# Patient Record
Sex: MALE | Race: WHITE | NOT HISPANIC OR LATINO | Employment: OTHER | URBAN - METROPOLITAN AREA
[De-identification: names, ages, dates, MRNs, and addresses within clinical notes are randomized per-mention and may not be internally consistent; named-entity substitution may affect disease eponyms.]

---

## 2024-07-11 ENCOUNTER — APPOINTMENT (OUTPATIENT)
Dept: RADIOLOGY | Facility: HOSPITAL | Age: 44
End: 2024-07-11
Payer: COMMERCIAL

## 2024-07-11 ENCOUNTER — HOSPITAL ENCOUNTER (EMERGENCY)
Facility: HOSPITAL | Age: 44
Discharge: HOME | End: 2024-07-11
Attending: STUDENT IN AN ORGANIZED HEALTH CARE EDUCATION/TRAINING PROGRAM
Payer: COMMERCIAL

## 2024-07-11 VITALS
HEIGHT: 69 IN | WEIGHT: 203 LBS | RESPIRATION RATE: 20 BRPM | HEART RATE: 60 BPM | SYSTOLIC BLOOD PRESSURE: 100 MMHG | DIASTOLIC BLOOD PRESSURE: 62 MMHG | BODY MASS INDEX: 30.07 KG/M2 | OXYGEN SATURATION: 98 % | TEMPERATURE: 96.6 F

## 2024-07-11 DIAGNOSIS — R52 BODY ACHES: ICD-10-CM

## 2024-07-11 DIAGNOSIS — J06.9 UPPER RESPIRATORY TRACT INFECTION, UNSPECIFIED TYPE: Primary | ICD-10-CM

## 2024-07-11 LAB — SARS-COV-2 RNA RESP QL NAA+PROBE: NOT DETECTED

## 2024-07-11 PROCEDURE — 71046 X-RAY EXAM CHEST 2 VIEWS: CPT | Mod: FOREIGN READ | Performed by: RADIOLOGY

## 2024-07-11 PROCEDURE — 99283 EMERGENCY DEPT VISIT LOW MDM: CPT | Mod: 25

## 2024-07-11 PROCEDURE — 87635 SARS-COV-2 COVID-19 AMP PRB: CPT | Performed by: STUDENT IN AN ORGANIZED HEALTH CARE EDUCATION/TRAINING PROGRAM

## 2024-07-11 PROCEDURE — 71046 X-RAY EXAM CHEST 2 VIEWS: CPT

## 2024-07-11 RX ORDER — ACETAMINOPHEN 325 MG/1
650 TABLET ORAL ONCE
Status: COMPLETED | OUTPATIENT
Start: 2024-07-11 | End: 2024-07-11

## 2024-07-11 ASSESSMENT — COLUMBIA-SUICIDE SEVERITY RATING SCALE - C-SSRS
6. HAVE YOU EVER DONE ANYTHING, STARTED TO DO ANYTHING, OR PREPARED TO DO ANYTHING TO END YOUR LIFE?: NO
1. IN THE PAST MONTH, HAVE YOU WISHED YOU WERE DEAD OR WISHED YOU COULD GO TO SLEEP AND NOT WAKE UP?: NO
2. HAVE YOU ACTUALLY HAD ANY THOUGHTS OF KILLING YOURSELF?: NO

## 2024-07-11 ASSESSMENT — PAIN SCALES - GENERAL
PAINLEVEL_OUTOF10: 10 - WORST POSSIBLE PAIN
PAINLEVEL_OUTOF10: 7

## 2024-07-11 ASSESSMENT — LIFESTYLE VARIABLES
EVER FELT BAD OR GUILTY ABOUT YOUR DRINKING: NO
EVER HAD A DRINK FIRST THING IN THE MORNING TO STEADY YOUR NERVES TO GET RID OF A HANGOVER: NO
HAVE PEOPLE ANNOYED YOU BY CRITICIZING YOUR DRINKING: NO
HAVE YOU EVER FELT YOU SHOULD CUT DOWN ON YOUR DRINKING: NO
TOTAL SCORE: 0

## 2024-07-11 ASSESSMENT — PAIN - FUNCTIONAL ASSESSMENT: PAIN_FUNCTIONAL_ASSESSMENT: 0-10

## 2024-07-11 NOTE — ED PROVIDER NOTES
EMERGENCY DEPARTMENT ENCOUNTER      Pt Name: Kishan Rae  MRN: [id number]  Birthdate 1980  Date of evaluation: 7/11/2024  Provider: Bienvenido Nicole DO    CHIEF COMPLAINT       Chief Complaint   Patient presents with    Flu Symptoms       HISTORY OF PRESENT ILLNESS    Kishan Rae is a 43 y.o. male who presents to the emergency department with Himself for flulike symptoms for the past 3 days.  Patient does note that his son is a nurse at [employer] and was recently exposed and diagnosed with human metapneumovirus.  He states over the past 3 days he has been having subjective fevers no measured fevers.  Decreased appetite as well as generalized bodyaches.  He did have some sinus pressure earlier today and the frontal sinuses has since resolved after ibuprofen at home.  Denies any further associated symptoms at this time.  No associated cough but significant congestion.  Non-smoker.          Nursing Notes were reviewed.    REVIEW OF SYSTEMS     CONSTITUTIONAL: Endorses subjective fever.  Denies sweats, chills.   NEURO: Denies difficulty walking, numbness, weakness, tingling, headache.   HEENT: Endorses rhinorrhea.  Denies sore throat, changes in vision.   CARDIO: Denies chest pain, palpitations.  PULM: Denies shortness of breath, cough.   GI: Denies abdominal pain, nausea, vomiting, diarrhea, constipation, melena, hematochezia.  : Denies painful urination, frequency, hematuria.   MSK: Endorses generalized bodyaches.  Denies recent trauma.   SKIN: Denies rash, lesions.   ENDOCRINE: Denies unexpected weight-loss.   HEME: Denies bleeding disorder.     PAST MEDICAL HISTORY   No past medical history on file.  Denies   SURGICAL HISTORY     No past surgical history on file.    ALLERGIES     Penicillins    FAMILY HISTORY     No family history on file.     SOCIAL HISTORY       Social History     Socioeconomic History    Marital status:        PHYSICAL EXAM   VS: As documented in the triage note from today's  date and EMR flowsheet were reviewed.  Gen: Well developed. No acute distress. Seated in bed. Appears nontoxic.   Skin: Warm. Dry. Intact. No rashes or lesions.  Eyes: Pupils equally round and reactive to light. Clear sclera. EOMI.  HENT: Atraumatic appearance. Mucosal membranes moist. No oral lesions, uvula midline, airway patent.  TMs clear bilaterally nares clear bilaterally no evidence of tonsillar exudates or stones no evidence of PTA or Veronica's.  No meningismal signs.  Brudzinski Kernig sign are negative.  CV: Regular rate and regular rhythm. S1, S2. No pedal edema. Warm extremities.  Resp: Nonlabored breathing Clear to auscultation bilaterally. No increased work of breathing.   GI: Soft and nontender. No rebound or guarding.   MSK: Symmetric muscle bulk. No joint swelling in the extremities. Compartments are soft. Neurovascularly intact x4 extremities. Radial pulses +2 equal bilaterally.   Neuro: Alert. CN II - XII intact. Speech fluent. Moving all extremities. No focal deficits. Gait normal.  Psych: Appropriate. Kempt.    DIAGNOSTIC RESULTS   RADIOLOGY:   Non-plain film images such as CT, Ultrasound and MRI are read by the radiologist. Plain radiographic images are visualized and preliminarily interpreted by the emergency physician with the below findings: Chest x-ray no signs of pneumonia.      Interpretation per the Radiologist below, if available at the time of this note:    XR chest 2 views   Final Result   No regions of airspace consolidation.   Signed by Jass Gomez MD            ED BEDSIDE ULTRASOUND:   Performed by ED Physician - none    LABS:  Labs Reviewed   SARS-COV-2 PCR - Normal       Result Value    Coronavirus 2019, PCR Not Detected      Narrative:     This assay has received FDA Emergency Use Authorization (EUA) and is only authorized for the duration of time that circumstances exist to justify the authorization of the emergency use of in vitro diagnostic tests for the detection of  "SARS-CoV-2 virus and/or diagnosis of COVID-19 infection under section 564(b)(1) of the Act, 21 U.S.C. 360bbb-3(b)(1). This assay is an in vitro diagnostic nucleic acid amplification test for the qualitative detection of SARS-CoV-2 from nasopharyngeal specimens and has been validated for use at [facility]. Negative results do not preclude COVID-19 infections and should not be used as the sole basis for diagnosis, treatment, or other management decisions.         All other labs were within normal range or not returned as of this dictation.    EMERGENCY DEPARTMENT COURSE/MDM:   Vitals:    Vitals:    07/11/24 0856 07/11/24 1105   BP: 132/86 100/62   BP Location: Right arm    Patient Position: Sitting    Pulse: 79 60   Resp: 18 20   Temp: 35.9 °C (96.6 °F)    TempSrc: Temporal    SpO2: 98% 98%   Weight: 92.1 kg (203 lb)    Height: 1.753 m (5' 9\")        I reviewed the patient's triage vitals and they are hypertensive recommend follow-up with primary physician for repeat checks.    Due to the above findings the following was ordered COVID swab, chest x-ray.    Patient overall very well-appearing no increased work of breathing saturating appropriately clear lung sounds chest x-ray is clear for pneumonia as well.  COVID swab is negative.  I do suspect with his recent exposure he likely does have human metapneumovirus as well.  He is recommended supportive therapy.  Rest hydration Tylenol Motrin alternating for any fevers or bodyaches.  He has no meningismal signs on examination no indication for LP low concern for meningitis at this time.  He is appreciative of care agreeable with this plan recommend follow-up with his primary physician in the coming days discharged in stable condition.    Diagnoses as of 07/11/24 1202   Upper respiratory tract infection, unspecified type   Body aches       Patient was counseled regarding labs, imaging, likely diagnosis, and plan. All questions were answered. "     ------------------------------------------------------------------  Information provided by the patient  Considered LP although not indicated.  Shared medical decision making patient agreeable with supportive therapy at home and close follow-up.  ------------------------------------------------------------------  ED Medications administered this visit:    Medications   acetaminophen (Tylenol) tablet 650 mg (650 mg oral Given 7/11/24 0923)       New Prescriptions from this visit:    There are no discharge medications for this patient.      Follow-up:  Ron Pat MD  [address]  [place] [id number]  [phone]    Schedule an appointment as soon as possible for a visit in 2 days      [facility]  [address] [facility] [address] [phone]    If symptoms worsen        Final Impression:   1. Upper respiratory tract infection, unspecified type    2. Body aches          Bienvenido Nicole DO    (Please note that portions of this note were completed with a voice recognition program.  Efforts were made to edit the dictations but occasionally words are mis-transcribed.)     Bienvenido Nicole DO  07/11/24 4778

## 2024-07-11 NOTE — ED TRIAGE NOTES
PT COMES TO ER WITH C/O FLU SYMPTOMS FOR 3 DAYS. PT STATES HE HAS BEEN HOT AND COLD, SWEATING (PT HAS SWEAT ON HIS FOREHEAD IN TRIAGE). ACHEY ALL OVER, AND A LOT OF PRESSURE IN HIS SINUSES. PT STATES THE ACHING IN HIS NECK AND BACK IS 10/10.  PT STATES SON WAS JUST RECENTLY DIAGNOSED WITH SOME SORT OF ILLNESS. THINKS ITS A FORM OF PNEUMONIA.

## 2024-07-11 NOTE — DISCHARGE INSTRUCTIONS
Suspect you likely have an upper respiratory viral infection.  As your son was exposed to human metapneumovirus this is likely the culprit.  Recommend supportive treatment rest hydration Tylenol Motrin alternating for any fevers body aches.  Should you been experiencing symptoms concerning to shortness of breath difficulty breathing uncontrollable fevers signs of dehydration but not limited to call 911 or return immediately.  Otherwise follow-up with your primary physician in the coming days to ensure improvement.